# Patient Record
Sex: FEMALE | Employment: FULL TIME | URBAN - METROPOLITAN AREA
[De-identification: names, ages, dates, MRNs, and addresses within clinical notes are randomized per-mention and may not be internally consistent; named-entity substitution may affect disease eponyms.]

---

## 2025-01-04 NOTE — PROGRESS NOTES
Jessica Ramirez is a 24 y.o. female who presents for evaluation of a pilonidal cyst as a referral from [facility]. She was seen at [facility] on 1/2/25 where the infected cyst was lanced. She was started on Augmentin. The area feels \"better,\" but is still painful.     PMH:    History reviewed. No pertinent past medical history.    PSH:    No past surgical history on file.    MEDS:    Prior to Visit Medications    Medication Sig Taking? Authorizing Provider   amoxicillin-clavulanate (AUGMENTIN) 875-125 MG per tablet by NOT APPLICABLE route Yes Chris Barr MD   amphetamine-dextroamphetamine (ADDERALL XR) 30 MG extended release capsule TAKE 1 CAPSULE BY MOUTH EVERY MORNING AS NEEDED FOR INATTENTION Yes Chris Barr MD   REXULTI 2 MG TABS tablet  Yes Chris Barr MD   DULoxetine (CYMBALTA) 20 MG extended release capsule Take by mouth daily Yes Chris Barr MD   insulin aspart (NOVOLOG) 100 UNIT/ML injection vial Up to 100 units a day via pump to cover meals and high blood sugars Yes Chris Barr MD   insulin glargine (LANTUS SOLOSTAR) 100 UNIT/ML injection pen Inject 24 Units into the skin daily Yes Chris Barr MD   lamoTRIgine (LAMICTAL) 100 MG tablet Take 2 tablets by mouth daily Yes Chris Barr MD       ALLERGIES:      No Known Allergies    SH:    Social History     Tobacco Use    Smoking status: Never    Smokeless tobacco: Never       FH:    No family history on file.    ROS: The patient has no difficulty with chest pain or shortness of breath.  No fever or chills.  Comprehensive 13 point review of systems was otherwise unremarkable except as noted above.    Physical Exam:     BP (!) 141/100   Pulse (!) 114   Wt 93.9 kg (207 lb 1.6 oz)     General: Alert, oriented, cooperative white female in no acute distress.   Eyes: Sclera are clear. Conjunctiva and lids within normal limits. No icterus.  Ears and Nose: no gross deformities to visual inspection,

## 2025-01-07 ENCOUNTER — OFFICE VISIT (OUTPATIENT)
Dept: SURGERY | Age: 25
End: 2025-01-07
Payer: COMMERCIAL

## 2025-01-07 ENCOUNTER — PREP FOR PROCEDURE (OUTPATIENT)
Dept: SURGERY | Age: 25
End: 2025-01-07

## 2025-01-07 VITALS — SYSTOLIC BLOOD PRESSURE: 141 MMHG | DIASTOLIC BLOOD PRESSURE: 100 MMHG | WEIGHT: 207.1 LBS | HEART RATE: 114 BPM

## 2025-01-07 DIAGNOSIS — L05.91 PILONIDAL CYST: Primary | ICD-10-CM

## 2025-01-07 DIAGNOSIS — L05.91 PILONIDAL CYST: ICD-10-CM

## 2025-01-07 PROCEDURE — 99203 OFFICE O/P NEW LOW 30 MIN: CPT | Performed by: SURGERY

## 2025-01-07 RX ORDER — DEXTROAMPHETAMINE SACCHARATE, AMPHETAMINE ASPARTATE MONOHYDRATE, DEXTROAMPHETAMINE SULFATE AND AMPHETAMINE SULFATE 7.5; 7.5; 7.5; 7.5 MG/1; MG/1; MG/1; MG/1
CAPSULE, EXTENDED RELEASE ORAL
COMMUNITY

## 2025-01-07 RX ORDER — INSULIN GLARGINE 100 [IU]/ML
24 INJECTION, SOLUTION SUBCUTANEOUS DAILY
COMMUNITY
Start: 2024-10-28

## 2025-01-07 RX ORDER — DULOXETIN HYDROCHLORIDE 20 MG/1
CAPSULE, DELAYED RELEASE ORAL DAILY
COMMUNITY

## 2025-01-07 RX ORDER — LAMOTRIGINE 100 MG/1
200 TABLET ORAL DAILY
COMMUNITY

## 2025-01-07 RX ORDER — INSULIN ASPART 100 [IU]/ML
INJECTION, SOLUTION INTRAVENOUS; SUBCUTANEOUS
COMMUNITY
Start: 2024-11-11

## 2025-01-07 RX ORDER — BREXPIPRAZOLE 2 MG/1
TABLET ORAL
COMMUNITY
Start: 2024-11-18

## 2025-01-07 NOTE — PATIENT INSTRUCTIONS
aDate: 2025      Name: Jessica Ramirez      MRN: [id number]       : 2000       Age: 24 y.o.    Sex: female        Cheyenne Alvarado, APRN - NP       CC:    Chief Complaint   Patient presents with    New Patient     NP - Pilonidal abscess       HPI:     Jessica Ramirez is a 24 y.o. female who presents for evaluation of a pilonidal cyst as a referral from [facility]. She was seen at [facility] on 25 where the infected cyst was lanced. She was started on Augmentin. The area feels \"better,\" but is still painful.     PMH:    History reviewed. No pertinent past medical history.    PSH:    No past surgical history on file.    MEDS:    Prior to Visit Medications    Medication Sig Taking? Authorizing Provider   amoxicillin-clavulanate (AUGMENTIN) 875-125 MG per tablet by NOT APPLICABLE route Yes Chris Barr MD   amphetamine-dextroamphetamine (ADDERALL XR) 30 MG extended release capsule TAKE 1 CAPSULE BY MOUTH EVERY MORNING AS NEEDED FOR INATTENTION Yes ProviderChris MD   REXULTI 2 MG TABS tablet  Yes Chris Barr MD   DULoxetine (CYMBALTA) 20 MG extended release capsule Take by mouth daily Yes ProviderChris MD   insulin aspart (NOVOLOG) 100 UNIT/ML injection vial Up to 100 units a day via pump to cover meals and high blood sugars Yes ProviderChris MD   insulin glargine (LANTUS SOLOSTAR) 100 UNIT/ML injection pen Inject 24 Units into the skin daily Yes Provider, MD Chris   lamoTRIgine (LAMICTAL) 100 MG tablet Take 2 tablets by mouth daily Yes Provider, MD Chris       ALLERGIES:      No Known Allergies    SH:    Social History     Tobacco Use    Smoking status: Never    Smokeless tobacco: Never       FH:    No family history on file.    ROS: The patient has no difficulty with chest pain or shortness of breath.  No fever or chills.  Comprehensive 13 point review of systems was otherwise unremarkable except as noted above.    Physical Exam:     BP (!) 141/100   Pulse

## 2025-02-11 NOTE — PERIOP NOTE
Four voicemail's left for pt. Staff message sent to Paula Monge for any additional contact numbers for pt.

## 2025-02-13 NOTE — H&P
Jessica Ramirez is a 24 y.o. female who presents for evaluation of a pilonidal cyst as a referral from [facility]. She was seen at [facility] on 1/2/25 where the infected cyst was lanced. She was started on Augmentin. The area feels \"better,\" but is still painful.     PMH:    History reviewed. No pertinent past medical history.    PSH:    No past surgical history on file.    MEDS:    Prior to Visit Medications    Medication Sig Taking? Authorizing Provider   amoxicillin-clavulanate (AUGMENTIN) 875-125 MG per tablet by NOT APPLICABLE route Yes Chris Barr MD   amphetamine-dextroamphetamine (ADDERALL XR) 30 MG extended release capsule TAKE 1 CAPSULE BY MOUTH EVERY MORNING AS NEEDED FOR INATTENTION Yes Chris Barr MD   REXULTI 2 MG TABS tablet  Yes Chris Barr MD   DULoxetine (CYMBALTA) 20 MG extended release capsule Take by mouth daily Yes Chris Barr MD   insulin aspart (NOVOLOG) 100 UNIT/ML injection vial Up to 100 units a day via pump to cover meals and high blood sugars Yes Chris Barr MD   insulin glargine (LANTUS SOLOSTAR) 100 UNIT/ML injection pen Inject 24 Units into the skin daily Yes Chris Barr MD   lamoTRIgine (LAMICTAL) 100 MG tablet Take 2 tablets by mouth daily Yes Chris Barr MD       ALLERGIES:      No Known Allergies    SH:    Social History     Tobacco Use    Smoking status: Never    Smokeless tobacco: Never       FH:    No family history on file.    ROS: The patient has no difficulty with chest pain or shortness of breath.  No fever or chills.  Comprehensive 13 point review of systems was otherwise unremarkable except as noted above.    Physical Exam:     BP (!) 141/100   Pulse (!) 114   Wt 93.9 kg (207 lb 1.6 oz)     General: Alert, oriented, cooperative white female in no acute distress.   Eyes: Sclera are clear. Conjunctiva and lids within normal limits. No icterus.  Ears and Nose: no gross deformities to visual inspection,

## 2025-02-17 RX ORDER — DEXTROAMPHETAMINE SACCHARATE, AMPHETAMINE ASPARTATE, DEXTROAMPHETAMINE SULFATE AND AMPHETAMINE SULFATE 5; 5; 5; 5 MG/1; MG/1; MG/1; MG/1
20 TABLET ORAL DAILY PRN
COMMUNITY

## 2025-02-17 NOTE — PERIOP NOTE
Patient verified name and .  Order for consent  was not found in EHR.  Type 1B surgery, PAT phone assessment complete.    Labs per surgeon: Orders not received.  Labs per anesthesia protocol: Urine pregnancy DOS, blood glucose     Patient answered medical/surgical history questions at their best of ability. All prior to admission medications documented in EPIC.    Patient instructed to continue taking all prescription medications up to the day of surgery but to take only the following medications the day of surgery according to anesthesia guidelines with a small sip of water: lamictal, cymbalta, adderall, rexulti, adderall. Pt instructed to not give any blouses with insulin pump on the day of surgery. Also, patient is requested to take 2 Tylenol in the morning and then again before bed on the day before surgery. Regular or extra strength may be used.       Patient informed that all vitamins and supplements should be held 7 days prior to surgery and NSAIDS 5 days prior to surgery.     Patient instructed on the following:    > Arrive at A Entrance, time of arrival to be called the day before by 1700  > No food after midnight, patient may drink clear liquids up until 2 hours prior to arrival. No gum, candy, mints.   > Responsible adult must drive patient to the hospital, stay during surgery, and patient will need supervision 24 hours after anesthesia  > Use non moisturizing soap in shower the night before surgery and on the morning of surgery  > All piercings must be removed prior to arrival.    > Leave all valuables (money and jewelry) at home but bring insurance card and ID on DOS.   > Do not wear make-up, nail polish, lotions, cologne, perfumes, powders, or oil on skin. Artificial nails are not permitted.

## 2025-02-18 ENCOUNTER — HOSPITAL ENCOUNTER (OUTPATIENT)
Age: 25
Setting detail: OUTPATIENT SURGERY
Discharge: HOME | End: 2025-02-18
Attending: SURGERY | Admitting: SURGERY
Payer: COMMERCIAL

## 2025-02-18 ENCOUNTER — ANESTHESIA (OUTPATIENT)
Dept: SURGERY | Age: 25
End: 2025-02-18
Payer: COMMERCIAL

## 2025-02-18 ENCOUNTER — ANESTHESIA EVENT (OUTPATIENT)
Dept: SURGERY | Age: 25
End: 2025-02-18
Payer: COMMERCIAL

## 2025-02-18 VITALS
OXYGEN SATURATION: 100 % | WEIGHT: 210.9 LBS | RESPIRATION RATE: 22 BRPM | DIASTOLIC BLOOD PRESSURE: 57 MMHG | HEIGHT: 66 IN | BODY MASS INDEX: 33.89 KG/M2 | SYSTOLIC BLOOD PRESSURE: 111 MMHG | HEART RATE: 77 BPM | TEMPERATURE: 98 F

## 2025-02-18 DIAGNOSIS — L05.91 PILONIDAL CYST: Primary | ICD-10-CM

## 2025-02-18 LAB
GLUCOSE BLD STRIP.AUTO-MCNC: 116 MG/DL (ref 65–100)
GLUCOSE BLD STRIP.AUTO-MCNC: 118 MG/DL (ref 65–100)
HCG UR QL: NEGATIVE
SERVICE CMNT-IMP: ABNORMAL
SERVICE CMNT-IMP: ABNORMAL

## 2025-02-18 PROCEDURE — 3600000002 HC SURGERY LEVEL 2 BASE: Performed by: SURGERY

## 2025-02-18 PROCEDURE — 6370000000 HC RX 637 (ALT 250 FOR IP): Performed by: ANESTHESIOLOGY

## 2025-02-18 PROCEDURE — 2500000003 HC RX 250 WO HCPCS: Performed by: NURSE ANESTHETIST, CERTIFIED REGISTERED

## 2025-02-18 PROCEDURE — 3600000012 HC SURGERY LEVEL 2 ADDTL 15MIN: Performed by: SURGERY

## 2025-02-18 PROCEDURE — 3700000000 HC ANESTHESIA ATTENDED CARE: Performed by: SURGERY

## 2025-02-18 PROCEDURE — 7100000000 HC PACU RECOVERY - FIRST 15 MIN: Performed by: SURGERY

## 2025-02-18 PROCEDURE — 82962 GLUCOSE BLOOD TEST: CPT

## 2025-02-18 PROCEDURE — 7100000011 HC PHASE II RECOVERY - ADDTL 15 MIN: Performed by: SURGERY

## 2025-02-18 PROCEDURE — 6360000002 HC RX W HCPCS: Performed by: NURSE ANESTHETIST, CERTIFIED REGISTERED

## 2025-02-18 PROCEDURE — 11772 EXC PILONIDAL CYST COMP: CPT | Performed by: SURGERY

## 2025-02-18 PROCEDURE — 3700000001 HC ADD 15 MINUTES (ANESTHESIA): Performed by: SURGERY

## 2025-02-18 PROCEDURE — 88304 TISSUE EXAM BY PATHOLOGIST: CPT

## 2025-02-18 PROCEDURE — 2709999900 HC NON-CHARGEABLE SUPPLY: Performed by: SURGERY

## 2025-02-18 PROCEDURE — 7100000010 HC PHASE II RECOVERY - FIRST 15 MIN: Performed by: SURGERY

## 2025-02-18 PROCEDURE — 7100000001 HC PACU RECOVERY - ADDTL 15 MIN: Performed by: SURGERY

## 2025-02-18 PROCEDURE — 81025 URINE PREGNANCY TEST: CPT

## 2025-02-18 PROCEDURE — 6360000002 HC RX W HCPCS: Performed by: SURGERY

## 2025-02-18 PROCEDURE — 2580000003 HC RX 258: Performed by: ANESTHESIOLOGY

## 2025-02-18 RX ORDER — BUPIVACAINE HYDROCHLORIDE 5 MG/ML
INJECTION, SOLUTION EPIDURAL; INTRACAUDAL PRN
Status: DISCONTINUED | OUTPATIENT
Start: 2025-02-18 | End: 2025-02-18 | Stop reason: HOSPADM

## 2025-02-18 RX ORDER — OXYCODONE AND ACETAMINOPHEN 5; 325 MG/1; MG/1
1 TABLET ORAL EVERY 6 HOURS PRN
Qty: 20 TABLET | Refills: 0 | Status: SHIPPED | OUTPATIENT
Start: 2025-02-18 | End: 2025-02-23

## 2025-02-18 RX ORDER — ROCURONIUM BROMIDE 10 MG/ML
INJECTION, SOLUTION INTRAVENOUS
Status: DISCONTINUED | OUTPATIENT
Start: 2025-02-18 | End: 2025-02-18 | Stop reason: SDUPTHER

## 2025-02-18 RX ORDER — OXYCODONE HYDROCHLORIDE 5 MG/1
10 TABLET ORAL PRN
Status: DISCONTINUED | OUTPATIENT
Start: 2025-02-18 | End: 2025-02-18 | Stop reason: HOSPADM

## 2025-02-18 RX ORDER — ONDANSETRON 2 MG/ML
INJECTION INTRAMUSCULAR; INTRAVENOUS
Status: DISCONTINUED | OUTPATIENT
Start: 2025-02-18 | End: 2025-02-18 | Stop reason: SDUPTHER

## 2025-02-18 RX ORDER — ONDANSETRON 2 MG/ML
4 INJECTION INTRAMUSCULAR; INTRAVENOUS
Status: DISCONTINUED | OUTPATIENT
Start: 2025-02-18 | End: 2025-02-18 | Stop reason: HOSPADM

## 2025-02-18 RX ORDER — MIDAZOLAM HYDROCHLORIDE 1 MG/ML
INJECTION, SOLUTION INTRAMUSCULAR; INTRAVENOUS
Status: DISCONTINUED | OUTPATIENT
Start: 2025-02-18 | End: 2025-02-18 | Stop reason: SDUPTHER

## 2025-02-18 RX ORDER — KETAMINE HCL IN NACL, ISO-OSM 20 MG/2 ML
SYRINGE (ML) INJECTION
Status: DISCONTINUED | OUTPATIENT
Start: 2025-02-18 | End: 2025-02-18 | Stop reason: SDUPTHER

## 2025-02-18 RX ORDER — KETOROLAC TROMETHAMINE 30 MG/ML
INJECTION, SOLUTION INTRAMUSCULAR; INTRAVENOUS
Status: DISCONTINUED | OUTPATIENT
Start: 2025-02-18 | End: 2025-02-18 | Stop reason: SDUPTHER

## 2025-02-18 RX ORDER — SODIUM CHLORIDE 0.9 % (FLUSH) 0.9 %
5-40 SYRINGE (ML) INJECTION EVERY 12 HOURS SCHEDULED
Status: DISCONTINUED | OUTPATIENT
Start: 2025-02-18 | End: 2025-02-18 | Stop reason: HOSPADM

## 2025-02-18 RX ORDER — NALOXONE HYDROCHLORIDE 0.4 MG/ML
INJECTION, SOLUTION INTRAMUSCULAR; INTRAVENOUS; SUBCUTANEOUS PRN
Status: DISCONTINUED | OUTPATIENT
Start: 2025-02-18 | End: 2025-02-18 | Stop reason: HOSPADM

## 2025-02-18 RX ORDER — PROPOFOL 10 MG/ML
INJECTION, EMULSION INTRAVENOUS
Status: DISCONTINUED | OUTPATIENT
Start: 2025-02-18 | End: 2025-02-18 | Stop reason: SDUPTHER

## 2025-02-18 RX ORDER — FENTANYL CITRATE 50 UG/ML
INJECTION, SOLUTION INTRAMUSCULAR; INTRAVENOUS
Status: DISCONTINUED | OUTPATIENT
Start: 2025-02-18 | End: 2025-02-18 | Stop reason: SDUPTHER

## 2025-02-18 RX ORDER — ACETAMINOPHEN 500 MG
1000 TABLET ORAL ONCE
Status: COMPLETED | OUTPATIENT
Start: 2025-02-18 | End: 2025-02-18

## 2025-02-18 RX ORDER — SODIUM CHLORIDE 0.9 % (FLUSH) 0.9 %
5-40 SYRINGE (ML) INJECTION PRN
Status: DISCONTINUED | OUTPATIENT
Start: 2025-02-18 | End: 2025-02-18 | Stop reason: HOSPADM

## 2025-02-18 RX ORDER — SCOPOLAMINE 1 MG/3D
1 PATCH, EXTENDED RELEASE TRANSDERMAL
Status: DISCONTINUED | OUTPATIENT
Start: 2025-02-18 | End: 2025-02-18 | Stop reason: HOSPADM

## 2025-02-18 RX ORDER — SODIUM CHLORIDE 9 MG/ML
INJECTION, SOLUTION INTRAVENOUS PRN
Status: DISCONTINUED | OUTPATIENT
Start: 2025-02-18 | End: 2025-02-18 | Stop reason: HOSPADM

## 2025-02-18 RX ORDER — SODIUM CHLORIDE, SODIUM LACTATE, POTASSIUM CHLORIDE, CALCIUM CHLORIDE 600; 310; 30; 20 MG/100ML; MG/100ML; MG/100ML; MG/100ML
INJECTION, SOLUTION INTRAVENOUS CONTINUOUS
Status: DISCONTINUED | OUTPATIENT
Start: 2025-02-18 | End: 2025-02-18 | Stop reason: HOSPADM

## 2025-02-18 RX ORDER — DIPHENHYDRAMINE HYDROCHLORIDE 50 MG/ML
12.5 INJECTION INTRAMUSCULAR; INTRAVENOUS
Status: DISCONTINUED | OUTPATIENT
Start: 2025-02-18 | End: 2025-02-18 | Stop reason: HOSPADM

## 2025-02-18 RX ORDER — SUCCINYLCHOLINE CHLORIDE 20 MG/ML
INJECTION INTRAMUSCULAR; INTRAVENOUS
Status: DISCONTINUED | OUTPATIENT
Start: 2025-02-18 | End: 2025-02-18 | Stop reason: SDUPTHER

## 2025-02-18 RX ORDER — PROCHLORPERAZINE EDISYLATE 5 MG/ML
5 INJECTION INTRAMUSCULAR; INTRAVENOUS
Status: DISCONTINUED | OUTPATIENT
Start: 2025-02-18 | End: 2025-02-18 | Stop reason: HOSPADM

## 2025-02-18 RX ORDER — LIDOCAINE HYDROCHLORIDE 10 MG/ML
1 INJECTION, SOLUTION INFILTRATION; PERINEURAL
Status: DISCONTINUED | OUTPATIENT
Start: 2025-02-18 | End: 2025-02-18 | Stop reason: HOSPADM

## 2025-02-18 RX ORDER — OXYCODONE HYDROCHLORIDE 5 MG/1
5 TABLET ORAL PRN
Status: DISCONTINUED | OUTPATIENT
Start: 2025-02-18 | End: 2025-02-18 | Stop reason: HOSPADM

## 2025-02-18 RX ORDER — MIDAZOLAM HYDROCHLORIDE 2 MG/2ML
2 INJECTION, SOLUTION INTRAMUSCULAR; INTRAVENOUS
Status: DISCONTINUED | OUTPATIENT
Start: 2025-02-18 | End: 2025-02-18 | Stop reason: HOSPADM

## 2025-02-18 RX ADMIN — Medication 2 G: at 09:03

## 2025-02-18 RX ADMIN — FENTANYL CITRATE 50 MCG: 50 INJECTION, SOLUTION INTRAMUSCULAR; INTRAVENOUS at 09:23

## 2025-02-18 RX ADMIN — MIDAZOLAM 2 MG: 1 INJECTION INTRAMUSCULAR; INTRAVENOUS at 09:01

## 2025-02-18 RX ADMIN — PROPOFOL 250 MG: 10 INJECTION, EMULSION INTRAVENOUS at 09:10

## 2025-02-18 RX ADMIN — ROCURONIUM BROMIDE 5 MG: 10 INJECTION, SOLUTION INTRAVENOUS at 09:10

## 2025-02-18 RX ADMIN — SODIUM CHLORIDE, POTASSIUM CHLORIDE, SODIUM LACTATE AND CALCIUM CHLORIDE: 600; 310; 30; 20 INJECTION, SOLUTION INTRAVENOUS at 08:40

## 2025-02-18 RX ADMIN — ONDANSETRON 4 MG: 2 INJECTION, SOLUTION INTRAMUSCULAR; INTRAVENOUS at 09:23

## 2025-02-18 RX ADMIN — Medication 140 MG: at 09:10

## 2025-02-18 RX ADMIN — ACETAMINOPHEN 1000 MG: 500 TABLET, FILM COATED ORAL at 08:39

## 2025-02-18 RX ADMIN — KETOROLAC TROMETHAMINE 30 MG: 30 INJECTION, SOLUTION INTRAMUSCULAR; INTRAVENOUS at 09:38

## 2025-02-18 RX ADMIN — Medication 20 MG: at 09:10

## 2025-02-18 RX ADMIN — FENTANYL CITRATE 50 MCG: 50 INJECTION, SOLUTION INTRAMUSCULAR; INTRAVENOUS at 09:10

## 2025-02-18 NOTE — INTERVAL H&P NOTE
Update History & Physical    The patient's History and Physical of 2/13/25 was reviewed with the patient and I examined the patient. There was no change. The surgical site was confirmed by the patient and me.     Plan: The risks, benefits, expected outcome, and alternative to the recommended procedure have been discussed with the patient. Patient understands and wants to proceed with the procedure.     Electronically signed by LUMA GALVEZ MD on 2/18/2025 at 7:48 AM

## 2025-02-18 NOTE — OP NOTE
PREOPERATIVE DIAGNOSIS:  Pilonidal cyst.     POSTOPERATIVE DIAGNOSIS:  Pilonidal cyst.     PROCEDURE:  Pilonidal cystectomy.     SURGEON:  Ean Monge MD     ASSISTANT:  None.       ANESTHESIA:  General endotracheal anesthesia plus local using 30 mL 0.5% Marcaine       ESTIMATED BLOOD LOSS:  10 mL.     SPECIMENS:  Pilonidal cyst tissue.     COMPLICATIONS:  None.     IMPLANTS:  None.     HISTORY:  This is a 25 year old female referred by [facility] with a pilonidal cyst.  She was referred for pain, swelling and some drainage from the lower back region. The patient was found to have multiple crypts and an active pilonidal cyst infection when I initially saw them.   The patient was scheduled for a pilonidal cystectomy today.  I went through the risks of bleeding, infection, anesthesia, hematoma, seroma formation, potential for wound disruption and the wound may need to be left open to heal by secondary intention and the potential for recurrence of these lesions.  I told the patient that pilonidal cysts are notoriously poor healing wounds and that wound disruption is definitely a potential problem.  The patient was agreeable, signed the consent form and was scheduled for today.     Patient was seen in the preoperative area, then transported to room number four at the [facility].  The patient was intubated on the stretcher then placed on the operating room table in the prone position.  The table was jackknifed.  The lower back was prepped and draped in the usual sterile manner.  Timeout was done, identifying the patient, surgeon procedure and her birthdate of [birth year]. Once everyone in the room agreed, we began the procedure.  The patient  had an open area, as well as multiple crypts in the midline of the lower back.  I used a marking pen to make a thin elliptical pattern around the larger open area and the multiple crypts.  I incised the lines I had drawn with the marking pen.  Using a 15 scalpel blade, I

## 2025-02-18 NOTE — ANESTHESIA PRE PROCEDURE
by mouth daily      insulin aspart (NOVOLOG) 100 UNIT/ML injection vial Up to 100 units a day via pump to cover meals and high blood sugars      lamoTRIgine (LAMICTAL) 100 MG tablet Take 2 tablets by mouth daily      amoxicillin-clavulanate (AUGMENTIN) 875-125 MG per tablet by NOT APPLICABLE route      insulin glargine (LANTUS SOLOSTAR) 100 UNIT/ML injection pen Inject 24 Units into the skin daily         Allergies:    Allergies   Allergen Reactions    Bee Venom Anaphylaxis       Problem List:    Patient Active Problem List   Diagnosis Code    Pilonidal cyst L05.91       Past Medical History:        Diagnosis Date    ADHD     Anxiety and depression     Awareness under anesthesia     Celiac disease     Type 1 diabetes (HCC)     Novolog- Insulin pump, Average fasting 120's, Hypo at 75, last HgbA1C 7.6 in 2025       Past Surgical History:        Procedure Laterality Date    WISDOM TOOTH EXTRACTION         Social History:    Social History     Tobacco Use    Smoking status: Former     Types: Cigarettes     Start date: 2021     Quit date: 2021     Years since quittin.0    Smokeless tobacco: Never   Substance Use Topics    Alcohol use: Not Currently     Alcohol/week: 2.0 standard drinks of alcohol     Types: 1 Glasses of wine, 1 Cans of beer per week                                Counseling given: Not Answered      Vital Signs (Current):   Vitals:    25 1025 25 1030 25 1035 25 1040   BP: 115/74 107/86 115/82 (!) 111   Pulse: 75 67 62 77   Resp: 16 12 12 22   Temp:   98 °F (36.7 °C) 98 °F (36.7 °C)   TempSrc:   Temporal Temporal   SpO2: 98% 98% 96% 100%   Weight:       Height:                                                  BP Readings from Last 3 Encounters:   25 (!) 111/57   25 (!) 141/100       NPO Status: Time of last liquid consumption:                         Time of last solid consumption:                         Date of last liquid consumption:

## 2025-02-18 NOTE — ANESTHESIA POSTPROCEDURE EVALUATION
Department of Anesthesiology  Postprocedure Note    Patient: Jessica Ramirez  MRN: [id number]  YOB: 2000  Date of evaluation: 2/18/2025    Procedure Summary       Date: 02/18/25 [place] [facility] [place] [facility] [place]    Anesthesia Start: 0859 Anesthesia Stop: 1011    Procedure: PILONIDAL CYSTECTOMY (Sacrum) Diagnosis:       Pilonidal cyst      (Pilonidal cyst [L05.91])    Surgeons: Ean Monge MD Responsible Provider: Joe Kirkpatrick MD    Anesthesia Type: Not recorded ASA Status: Not recorded            Anesthesia Type: No value filed.    Luba Phase I: Luba Score: 10    Luba Phase II: Luba Score: 10    Anesthesia Post Evaluation    Patient location during evaluation: PACU  Patient participation: complete - patient participated  Level of consciousness: awake and alert  Airway patency: patent  Nausea & Vomiting: no nausea and no vomiting  Cardiovascular status: hemodynamically stable  Respiratory status: acceptable, nonlabored ventilation and spontaneous ventilation  Hydration status: euvolemic  Comments: BP (!) 111/57   Pulse 77   Temp 98 °F (36.7 °C) (Temporal)   Resp 22   Ht 1.676 m (5' 5.98\")   Wt 95.7 kg (210 lb 14.4 oz)   SpO2 100%   BMI 34.06 kg/m²     Multimodal analgesia pain management approach  Pain management: adequate and satisfactory to patient        No notable events documented.

## 2025-02-18 NOTE — DISCHARGE INSTRUCTIONS
1. Diet as tolerated except for a  low fat diet after laparoscopic cholecystectomy.    2. Showering is allowed, but no tub baths, hot tubs or swimming.    3. Drainage is common from the wounds. Change the dressings as needed. Call our office if the wounds become reddened, tender, feel warm to the touch or pus starts to drain from the wound.    4. Take prescribed pain medication as directed, usually Percocet, Norco, Ultram or Dilaudid. Take over the counter medication for minor pain.    5. Ice may be applied intermittently to the surgical site or sites.    6. Call or office, [phone], if problems arise.    7. Follow up in the office at the assigned time.    8. Resume all medications as taken per surgery, unless specifically instructed not to take certain ones.    9. No lifting more than 25 pounds until told otherwise.    10. Driving is allowed 3 days after surgery as long as you feel comfortable enough to drive and have not taken any prescription pain medication prior to driving.After general anesthesia or intravenous sedation, for 24 hours or while taking prescription Narcotics:  Limit your activities  A responsible adult needs to be with you for the next 24 hours  Do not drive and operate hazardous machinery  Do not make important personal or business decisions  Do not drink alcoholic beverages  If you have not urinated within 8 hours after discharge, and you are experiencing discomfort from urinary retention, please go to the nearest ED.  If you have sleep apnea and have a CPAP machine, please use it for all naps and sleeping.  Please use caution when taking narcotics and any of your home medications that may cause drowsiness.  *  Please give a list of your current medications to your Primary Care Provider.  *  Please update this list whenever your medications are discontinued, doses are      changed, or new medications (including over-the-counter products) are added.  *  Please carry medication information

## 2025-02-28 ENCOUNTER — TELEPHONE (OUTPATIENT)
Dept: SURGERY | Age: 25
End: 2025-02-28

## 2025-02-28 NOTE — TELEPHONE ENCOUNTER
Patient called asking for our email address to send over a return to work form to be filled out. Patient stated she is suppose to return back to work tomorrow 03/01/2025. I told patient the form will not be completed today, as the provider will have to sign it and the provider is not in the office today. Patient stated understanding.

## 2025-03-02 NOTE — PROGRESS NOTES
poDate: 3/2/2025      Name: Jessica Ramirez      MRN: [id number]       : 2000       Age: 25 y.o.    Sex: female        No, Pcp       CC:  No chief complaint on file.      HPI:  The patient presents for the first post-op visit s/p pilonidal cystectomy was done on 25. The pathology showed:    FINAL PATHOLOGIC DIAGNOSIS     A:  Pilonidal cyst, excision:   - Consistent with inflamed pilonidal cyst     3/3/25: Minimal pain. No fever or chills.     Physical Exam:     There were no vitals taken for this visit.    General: Alert, oriented, cooperative white female in no acute distress.     Neck: Supple, trachea midline, no appreciable thyromegaly  Resp: Breathing is  non-labored. Lungs clear to auscultation without wheezing or rhonchi   CV: RRR. No murmurs, rubs or gallops appreciated.  Abd: soft non-tender and non-distended without peritoneal signs. +bs    Back: wound intact, some serous drainage. No cellulitis.     Assessment/Plan:  eJssica Ramirez is a 25 y.o. female who is s/p pilonidal cystectomy done on 25.    1. Remove half of the staples. Leave the sutures.    2. Light activities.    3. Follow-up in one week.    LUMA GALVEZ MD  [facility]   3/2/2025  11:22 AM

## 2025-03-03 ENCOUNTER — OFFICE VISIT (OUTPATIENT)
Dept: SURGERY | Age: 25
End: 2025-03-03

## 2025-03-03 DIAGNOSIS — L05.91 PILONIDAL CYST: Primary | ICD-10-CM

## 2025-03-03 PROCEDURE — 99024 POSTOP FOLLOW-UP VISIT: CPT | Performed by: SURGERY

## 2025-03-05 NOTE — PROGRESS NOTES
poDate: 3/5/2025      Name: Jessica Ramirez      MRN: [id number]       : 2000       Age: 25 y.o.    Sex: female        No, Pcp       CC:  No chief complaint on file.      HPI:  The patient presents for the second post-op visit s/p pilonidal cystectomy was done on 25. The pathology showed:    FINAL PATHOLOGIC DIAGNOSIS     A:  Pilonidal cyst, excision:   - Consistent with inflamed pilonidal cyst     3/3/25: Minimal pain. No fever or chills.     3/10/25: POD #20. Still having some drainage. No fever or chills.     Physical Exam:     There were no vitals taken for this visit.    General: Alert, oriented, cooperative white female in no acute distress.     Neck: Supple, trachea midline, no appreciable thyromegaly  Resp: Breathing is  non-labored. Lungs clear to auscultation without wheezing or rhonchi   CV: RRR. No murmurs, rubs or gallops appreciated.  Abd: soft non-tender and non-distended without peritoneal signs. +bs    Back: wound intact, some serous drainage. No cellulitis.     Assessment/Plan:  Jessica Ramirez is a 25 y.o. female who is s/p pilonidal cystectomy done on 25.    1. Remove all of the staples. Leave the sutures.    2. Light activities.    3. Follow-up in one week.    LUMA GALVEZ MD  [facility]   3/5/2025  1:47 PM

## 2025-03-10 ENCOUNTER — OFFICE VISIT (OUTPATIENT)
Dept: SURGERY | Age: 25
End: 2025-03-10

## 2025-03-10 DIAGNOSIS — L05.91 PILONIDAL CYST: Primary | ICD-10-CM

## 2025-03-10 PROCEDURE — 99024 POSTOP FOLLOW-UP VISIT: CPT | Performed by: SURGERY

## 2025-03-14 NOTE — PROGRESS NOTES
poDate: 3/14/2025      Name: Jessica Ramirez      MRN: [id number]       : 2000       Age: 25 y.o.    Sex: female        No, Pcp       CC:  No chief complaint on file.      HPI:  The patient presents for the third post-op visit s/p pilonidal cystectomy was done on 25. The pathology showed:    FINAL PATHOLOGIC DIAGNOSIS     A:  Pilonidal cyst, excision:   - Consistent with inflamed pilonidal cyst     3/3/25: Minimal pain. No fever or chills.     3/10/25: POD #20. Still having some drainage. No fever or chills.     3/17/25: Remove all sutures and staples. One staple got \"turned around\" so that we had to use some local anesthesia to get it out.     Physical Exam:     There were no vitals taken for this visit.    General: Alert, oriented, cooperative white female in no acute distress.     Neck: Supple, trachea midline, no appreciable thyromegaly  Resp: Breathing is  non-labored. Lungs clear to auscultation without wheezing or rhonchi   CV: RRR. No murmurs, rubs or gallops appreciated.  Abd: soft non-tender and non-distended without peritoneal signs. +bs    Back: wound edges have  slightly in the midportion of the wound, some serous drainage. No cellulitis.     Assessment/Plan:  Jessica Ramirez is a 25 y.o. female who is s/p pilonidal cystectomy done on 25.    1. Remove the sutures.    2. Light activities.    3. Follow-up 2 weeks.    LUMA GALVEZ MD  [facility]   3/14/2025  12:31 PM

## 2025-03-17 ENCOUNTER — OFFICE VISIT (OUTPATIENT)
Dept: SURGERY | Age: 25
End: 2025-03-17

## 2025-03-17 DIAGNOSIS — L05.91 PILONIDAL CYST: Primary | ICD-10-CM

## 2025-03-17 PROCEDURE — 99024 POSTOP FOLLOW-UP VISIT: CPT | Performed by: SURGERY

## 2025-03-28 NOTE — PROGRESS NOTES
poDate: 3/28/2025      Name: Jessica Ramirez      MRN: [id number]       : 2000       Age: 25 y.o.    Sex: female        No, Pcp       CC:  No chief complaint on file.      HPI:  The patient presents for the fourth post-op visit s/p pilonidal cystectomy was done on 25. The pathology showed:    FINAL PATHOLOGIC DIAGNOSIS     A:  Pilonidal cyst, excision:   - Consistent with inflamed pilonidal cyst     3/3/25: Minimal pain. No fever or chills.     3/10/25: POD #20. Still having some drainage. No fever or chills.     3/17/25: Remove all sutures and staples. One staple got \"turned around\" so that we had to use some local anesthesia to get it out.     3/31/25: Still has a small opening. She is an IDDM patient who she says has \"very slow healing.\"     Physical Exam:     There were no vitals taken for this visit.    General: Alert, oriented, cooperative white female in no acute distress.     Neck: Supple, trachea midline, no appreciable thyromegaly  Resp: Breathing is  non-labored. Lungs clear to auscultation without wheezing or rhonchi   CV: RRR. No murmurs, rubs or gallops appreciated.  Abd: soft non-tender and non-distended without peritoneal signs. +bs    Back: small opening at pilonidal cystectomy site with hypertrophic granulation tissue present. No cellulitis.     Assessment/Plan:  Jessica Ramirez is a 25 y.o. female who is s/p pilonidal cystectomy done on 25.    1. Silver nitrate sticks to the open area.    2.  F/U in 2 weeks.    LUMA GALVEZ MD  [facility]   3/28/2025  8:55 AM

## 2025-03-31 ENCOUNTER — OFFICE VISIT (OUTPATIENT)
Dept: SURGERY | Age: 25
End: 2025-03-31

## 2025-03-31 DIAGNOSIS — L05.91 PILONIDAL CYST: Primary | ICD-10-CM

## 2025-03-31 PROCEDURE — 99024 POSTOP FOLLOW-UP VISIT: CPT | Performed by: SURGERY

## 2025-04-11 NOTE — PROGRESS NOTES
poDate: 2025      Name: Jessica Ramirez      MRN: [id number]       : 2000       Age: 25 y.o.    Sex: female        No, Pcp       CC:  No chief complaint on file.      HPI:  The patient presents for the fifth post-op visit s/p pilonidal cystectomy was done on 25. The pathology showed:    FINAL PATHOLOGIC DIAGNOSIS     A:  Pilonidal cyst, excision:   - Consistent with inflamed pilonidal cyst     3/3/25: Minimal pain. No fever or chills.     3/10/25: POD #20. Still having some drainage. No fever or chills.     3/17/25: Remove all sutures and staples. One staple got \"turned around\" so that we had to use some local anesthesia to get it out.     3/31/25: Still has a small opening. She is an IDDM patient who she says has \"very slow healing.\"     25: Some bleeding from the operative site. Minor discomfort.     Physical Exam:     There were no vitals taken for this visit.    General: Alert, oriented, cooperative white female in no acute distress.     Neck: Supple, trachea midline, no appreciable thyromegaly  Resp: Breathing is  non-labored. Lungs clear to auscultation without wheezing or rhonchi   CV: RRR. No murmurs, rubs or gallops appreciated.  Abd: soft non-tender and non-distended without peritoneal signs. +bs    Back: small opening at pilonidal cystectomy site with hypertrophic granulation tissue present. No cellulitis.     Assessment/Plan:  Jessica Ramirez is a 25 y.o. female who is s/p pilonidal cystectomy done on 25.    1. Silver nitrate sticks to the open area.    2.  F/U in 3 weeks.    LUMA GALVEZ MD  [facility]   2025  10:40 AM

## 2025-04-14 ENCOUNTER — OFFICE VISIT (OUTPATIENT)
Dept: SURGERY | Age: 25
End: 2025-04-14

## 2025-04-14 DIAGNOSIS — L05.91 PILONIDAL CYST: Primary | ICD-10-CM

## 2025-04-14 PROCEDURE — 99024 POSTOP FOLLOW-UP VISIT: CPT | Performed by: SURGERY

## 2025-05-01 NOTE — PROGRESS NOTES
poDate: 2025      Name: Jessica Ramirez      MRN: [id number]       : 2000       Age: 25 y.o.    Sex: female        No, Pcp       CC:  No chief complaint on file.      HPI:  The patient presents for the sixth post-op visit s/p pilonidal cystectomy was done on 25. The pathology showed:    FINAL PATHOLOGIC DIAGNOSIS     A:  Pilonidal cyst, excision:   - Consistent with inflamed pilonidal cyst     3/3/25: Minimal pain. No fever or chills.     3/10/25: POD #20. Still having some drainage. No fever or chills.     3/17/25: Remove all sutures and staples. One staple got \"turned around\" so that we had to use some local anesthesia to get it out.     3/31/25: Still has a small opening. She is an IDDM patient who she says has \"very slow healing.\"     25: Some bleeding from the operative site. Minor discomfort.     25: Wound granulating. No surrounding cellulitis.     Physical Exam:     There were no vitals taken for this visit.    General: Alert, oriented, cooperative white female in no acute distress.     Neck: Supple, trachea midline, no appreciable thyromegaly  Resp: Breathing is  non-labored. Lungs clear to auscultation without wheezing or rhonchi   CV: RRR. No murmurs, rubs or gallops appreciated.  Abd: soft non-tender and non-distended without peritoneal signs. +bs    Back: small opening at pilonidal cystectomy site with hypertrophic granulation tissue present. No cellulitis.     Assessment/Plan:  Jessica Ramirez is a 25 y.o. female who is s/p pilonidal cystectomy done on 25.    1. Silver nitrate sticks to the open area.    2.  F/U 3 weeks.    LUMA GALVEZ MD  [facility]   2025  8:46 AM

## 2025-05-05 ENCOUNTER — OFFICE VISIT (OUTPATIENT)
Dept: SURGERY | Age: 25
End: 2025-05-05

## 2025-05-05 DIAGNOSIS — L05.91 PILONIDAL CYST: Primary | ICD-10-CM

## 2025-05-05 PROCEDURE — 99024 POSTOP FOLLOW-UP VISIT: CPT | Performed by: SURGERY

## 2025-05-27 NOTE — PROGRESS NOTES
poDate: 2025      Name: Jessica Ramirez      MRN: [id number]       : 2000       Age: 25 y.o.    Sex: female        No, Pcp       CC:  No chief complaint on file.      HPI:  The patient presents for the seventh post-op visit s/p pilonidal cystectomy was done on 25. The pathology showed:    FINAL PATHOLOGIC DIAGNOSIS     A:  Pilonidal cyst, excision:   - Consistent with inflamed pilonidal cyst     3/3/25: Minimal pain. No fever or chills.     3/10/25: POD #20. Still having some drainage. No fever or chills.     3/17/25: Remove all sutures and staples. One staple got \"turned around\" so that we had to use some local anesthesia to get it out.     3/31/25: Still has a small opening. She is an IDDM patient who she says has \"very slow healing.\"     25: Some bleeding from the operative site. Minor discomfort.     25: Wound granulating. No surrounding cellulitis.     25: Small amount of drainage. No pain.     Physical Exam:     There were no vitals taken for this visit.    General: Alert, oriented, cooperative white female in no acute distress.     Neck: Supple, trachea midline, no appreciable thyromegaly  Resp: Breathing is  non-labored. Lungs clear to auscultation without wheezing or rhonchi   CV: RRR. No murmurs, rubs or gallops appreciated.  Abd: soft non-tender and non-distended without peritoneal signs. +bs    Back: one area with hypertrophic granulation tissue.      Assessment/Plan:  Jessica Ramirez is a 25 y.o. female who is s/p pilonidal cystectomy done on 25.    1. Silver nitrate sticks    2. Local wound care.    3.  F/U in 3 weeks.    LUMA GALVEZ MD  [facility]   2025  1:05 PM

## 2025-05-29 ENCOUNTER — OFFICE VISIT (OUTPATIENT)
Dept: SURGERY | Age: 25
End: 2025-05-29
Payer: COMMERCIAL

## 2025-05-29 VITALS
SYSTOLIC BLOOD PRESSURE: 135 MMHG | WEIGHT: 207 LBS | HEART RATE: 91 BPM | DIASTOLIC BLOOD PRESSURE: 87 MMHG | HEIGHT: 66 IN | BODY MASS INDEX: 33.27 KG/M2

## 2025-05-29 DIAGNOSIS — L05.91 PILONIDAL CYST: Primary | ICD-10-CM

## 2025-05-29 PROCEDURE — 99212 OFFICE O/P EST SF 10 MIN: CPT | Performed by: SURGERY

## 2025-05-29 PROCEDURE — G8427 DOCREV CUR MEDS BY ELIG CLIN: HCPCS | Performed by: SURGERY

## 2025-05-29 PROCEDURE — G8417 CALC BMI ABV UP PARAM F/U: HCPCS | Performed by: SURGERY

## 2025-05-29 PROCEDURE — 1036F TOBACCO NON-USER: CPT | Performed by: SURGERY

## 2025-06-18 NOTE — PROGRESS NOTES
poDate: 2025      Name: Jessica Ramirez      MRN: [id number]       : 2000       Age: 25 y.o.    Sex: female        TaneshaTrent duran, APRN - NP       CC:  No chief complaint on file.      HPI:  The patient presents for the seventh post-op visit s/p pilonidal cystectomy was done on 25. The pathology showed:    FINAL PATHOLOGIC DIAGNOSIS     A:  Pilonidal cyst, excision:   - Consistent with inflamed pilonidal cyst     3/3/25: Minimal pain. No fever or chills.     3/10/25: POD #20. Still having some drainage. No fever or chills.     3/17/25: Remove all sutures and staples. One staple got \"turned around\" so that we had to use some local anesthesia to get it out.     3/31/25: Still has a small opening. She is an IDDM patient who she says has \"very slow healing.\"     25: Some bleeding from the operative site. Minor discomfort.     25: Wound granulating. No surrounding cellulitis.     25: Small amount of drainage. No pain.     25: Wound has closed. No drainage.     Physical Exam:     There were no vitals taken for this visit.    General: Alert, oriented, cooperative white female in no acute distress.     Neck: Supple, trachea midline, no appreciable thyromegaly  Resp: Breathing is  non-labored. Lungs clear to auscultation without wheezing or rhonchi   CV: RRR. No murmurs, rubs or gallops appreciated.  Abd: soft non-tender and non-distended without peritoneal signs. +bs    Back: Wound has closed. No drainage. No signs of infection.      Assessment/Plan:  Jessica Ramirez is a 25 y.o. female who is s/p pilonidal cystectomy done on 25.    1. F/U prn.    LUMA GALVEZ MD  [facility]   2025  1:17 PM

## 2025-06-19 ENCOUNTER — OFFICE VISIT (OUTPATIENT)
Dept: SURGERY | Age: 25
End: 2025-06-19

## 2025-06-19 VITALS
SYSTOLIC BLOOD PRESSURE: 134 MMHG | HEIGHT: 66 IN | WEIGHT: 209 LBS | HEART RATE: 77 BPM | DIASTOLIC BLOOD PRESSURE: 75 MMHG | BODY MASS INDEX: 33.59 KG/M2

## 2025-06-19 DIAGNOSIS — L05.91 PILONIDAL CYST: Primary | ICD-10-CM

## 2025-06-19 PROCEDURE — 99024 POSTOP FOLLOW-UP VISIT: CPT | Performed by: SURGERY

## 2025-07-02 NOTE — PROGRESS NOTES
poDate: 2025      Name: Jessica Ramirez      MRN: [id number]       : 2000       Age: 25 y.o.    Sex: female        No, Pcp       CC:  No chief complaint on file.      HPI:  The patient presents for the [facility] post-op visit s/p pilonidal cystectomy was done on 25. The pathology showed:    FINAL PATHOLOGIC DIAGNOSIS     A:  Pilonidal cyst, excision:   - Consistent with inflamed pilonidal cyst     3/3/25: Minimal pain. No fever or chills.     3/10/25: POD #20. Still having some drainage. No fever or chills.     3/17/25: Remove all sutures and staples. One staple got \"turned around\" so that we had to use some local anesthesia to get it out.     3/31/25: Still has a small opening. She is an IDDM patient who she says has \"very slow healing.\"     25: Some bleeding from the operative site. Minor discomfort.     25: Wound granulating. No surrounding cellulitis.     25: Small amount of drainage. No pain.     25: Wound has closed. No drainage.     7/3/25: Small opening in the wound. No fever or chills.     Physical Exam:     There were no vitals taken for this visit.    General: Alert, oriented, cooperative white female in no acute distress.     Neck: Supple, trachea midline, no appreciable thyromegaly  Resp: Breathing is  non-labored. Lungs clear to auscultation without wheezing or rhonchi   CV: RRR. No murmurs, rubs or gallops appreciated.  Abd: soft non-tender and non-distended without peritoneal signs. +bs    Back: Wound has closed. No drainage. No signs of infection.      Assessment/Plan:  Jessica Ramirez is a 25 y.o. female who is s/p pilonidal cystectomy done on 25.    1. Silver nitrate sticks to open area.    2.  F/U in 3 weeks.    LUMA GALVEZ MD  [facility]   2025  9:50 AM

## 2025-07-03 ENCOUNTER — OFFICE VISIT (OUTPATIENT)
Dept: SURGERY | Age: 25
End: 2025-07-03

## 2025-07-03 VITALS
HEIGHT: 66 IN | HEART RATE: 92 BPM | BODY MASS INDEX: 33.91 KG/M2 | SYSTOLIC BLOOD PRESSURE: 133 MMHG | WEIGHT: 211 LBS | DIASTOLIC BLOOD PRESSURE: 87 MMHG

## 2025-07-03 DIAGNOSIS — L05.91 PILONIDAL CYST: Primary | ICD-10-CM

## 2025-07-03 PROCEDURE — 99024 POSTOP FOLLOW-UP VISIT: CPT | Performed by: SURGERY

## 2025-07-18 NOTE — PROGRESS NOTES
poDate: 2025      Name: Jessica Ramirez      MRN: [id number]       : 2000       Age: 25 y.o.    Sex: female        No, Pcp       CC:  No chief complaint on file.      HPI:  The patient presents for the ninth post-op visit s/p pilonidal cystectomy was done on 25. The pathology showed:    FINAL PATHOLOGIC DIAGNOSIS     A:  Pilonidal cyst, excision:   - Consistent with inflamed pilonidal cyst     3/3/25: Minimal pain. No fever or chills.     3/10/25: POD #20. Still having some drainage. No fever or chills.     3/17/25: Remove all sutures and staples. One staple got \"turned around\" so that we had to use some local anesthesia to get it out.     3/31/25: Still has a small opening. She is an IDDM patient who she says has \"very slow healing.\"     25: Some bleeding from the operative site. Minor discomfort.     25: Wound granulating. No surrounding cellulitis.     25: Small amount of drainage. No pain.     25: Wound has closed. No drainage.     7/3/25: Small opening in the wound. No fever or chills.    25: Two, small, superficial openings in the upper part of the wound. No cellulitis.    Physical Exam:     There were no vitals taken for this visit.    General: Alert, oriented, cooperative white female in no acute distress.     Neck: Supple, trachea midline, no appreciable thyromegaly  Resp: Breathing is  non-labored. Lungs clear to auscultation without wheezing or rhonchi   CV: RRR. No murmurs, rubs or gallops appreciated.  Abd: soft non-tender and non-distended without peritoneal signs. +bs    Back: Two, small, superficial openings in the upper part of the wound. No cellulitis.     Assessment/Plan:  Jessica Ramirez is a 25 y.o. female who is s/p pilonidal cystectomy done on 25.    1. Silver nitrate sticks to open area.    2.  F/U one month.    LUMA GALVEZ MD  [facility]   2025  10:11 AM

## 2025-07-21 ENCOUNTER — OFFICE VISIT (OUTPATIENT)
Dept: SURGERY | Age: 25
End: 2025-07-21
Payer: COMMERCIAL

## 2025-07-21 VITALS
SYSTOLIC BLOOD PRESSURE: 134 MMHG | DIASTOLIC BLOOD PRESSURE: 82 MMHG | BODY MASS INDEX: 33.23 KG/M2 | HEART RATE: 84 BPM | HEIGHT: 66 IN | WEIGHT: 206.8 LBS

## 2025-07-21 DIAGNOSIS — L05.91 PILONIDAL CYST: Primary | ICD-10-CM

## 2025-07-21 PROCEDURE — G8417 CALC BMI ABV UP PARAM F/U: HCPCS | Performed by: SURGERY

## 2025-07-21 PROCEDURE — G8427 DOCREV CUR MEDS BY ELIG CLIN: HCPCS | Performed by: SURGERY

## 2025-07-21 PROCEDURE — 1036F TOBACCO NON-USER: CPT | Performed by: SURGERY

## 2025-07-21 PROCEDURE — 99212 OFFICE O/P EST SF 10 MIN: CPT | Performed by: SURGERY

## 2025-07-21 RX ORDER — LISDEXAMFETAMINE DIMESYLATE 50 MG/1
CAPSULE ORAL
COMMUNITY
Start: 2025-07-08

## 2025-07-21 RX ORDER — URINE ACETONE TEST STRIPS
STRIP MISCELLANEOUS
COMMUNITY
Start: 2024-11-11

## 2025-07-21 RX ORDER — INSULIN PUMP SYRINGE, 3 ML
EACH MISCELLANEOUS
COMMUNITY
Start: 2025-05-15

## 2025-07-21 RX ORDER — BUPROPION HYDROCHLORIDE 150 MG/1
150 TABLET ORAL DAILY
COMMUNITY
Start: 2025-07-12

## 2025-08-12 ENCOUNTER — OFFICE VISIT (OUTPATIENT)
Dept: SURGERY | Age: 25
End: 2025-08-12

## 2025-08-12 VITALS
BODY MASS INDEX: 33.14 KG/M2 | SYSTOLIC BLOOD PRESSURE: 130 MMHG | WEIGHT: 206.2 LBS | HEIGHT: 66 IN | DIASTOLIC BLOOD PRESSURE: 79 MMHG | HEART RATE: 82 BPM

## 2025-08-12 DIAGNOSIS — L05.91 PILONIDAL CYST: Primary | ICD-10-CM

## 2025-08-12 PROCEDURE — 99024 POSTOP FOLLOW-UP VISIT: CPT | Performed by: SURGERY

## (undated) DEVICE — GARMENT,MEDLINE,DVT,INT,CALF,MED, GEN2: Brand: MEDLINE

## (undated) DEVICE — SOLUTION IRRIG 1000ML 0.9% SOD CHL USP POUR PLAS BTL

## (undated) DEVICE — GAUZE,PACKING STRIP,IODOFORM,1/2"X5YD,ST: Brand: CURAD

## (undated) DEVICE — GLOVE SURG SZ 75 CRM LTX FREE POLYISOPRENE POLYMER BEAD ANTI

## (undated) DEVICE — SUTURE ETHILON SZ 2-0 L18IN NONABSORBABLE BLK L26MM PS 3/8 585H

## (undated) DEVICE — SUTURE VICRYL SZ 2-0 L27IN ABSRB UD L26MM CT-2 1/2 CIR J269H

## (undated) DEVICE — ELECTRODE PT RET AD L9FT HI MOIST COND ADH HYDRGEL CORDED

## (undated) DEVICE — SHEET, T, LAPAROTOMY, STERILE: Brand: MEDLINE

## (undated) DEVICE — NEEDLE HYPO 21GA L1.5IN INTRAMUSCULAR S STL LATCH BVL UP

## (undated) DEVICE — CANISTER, RIGID, 2000CC: Brand: MEDLINE INDUSTRIES, INC.

## (undated) DEVICE — Device

## (undated) DEVICE — PREMIUM WET SKIN PREP TRAY: Brand: MEDLINE INDUSTRIES, INC.